# Patient Record
Sex: MALE | Race: WHITE
[De-identification: names, ages, dates, MRNs, and addresses within clinical notes are randomized per-mention and may not be internally consistent; named-entity substitution may affect disease eponyms.]

---

## 2018-11-01 ENCOUNTER — HOSPITAL ENCOUNTER (EMERGENCY)
Dept: HOSPITAL 75 - ER | Age: 58
Discharge: HOME | End: 2018-11-01
Payer: COMMERCIAL

## 2018-11-01 VITALS — BODY MASS INDEX: 35.31 KG/M2 | WEIGHT: 225 LBS | HEIGHT: 67 IN

## 2018-11-01 VITALS — DIASTOLIC BLOOD PRESSURE: 83 MMHG | SYSTOLIC BLOOD PRESSURE: 125 MMHG

## 2018-11-01 DIAGNOSIS — Z88.6: ICD-10-CM

## 2018-11-01 DIAGNOSIS — Z86.73: ICD-10-CM

## 2018-11-01 DIAGNOSIS — K21.9: ICD-10-CM

## 2018-11-01 DIAGNOSIS — J01.00: Primary | ICD-10-CM

## 2018-11-01 DIAGNOSIS — I10: ICD-10-CM

## 2018-11-01 LAB
ALBUMIN SERPL-MCNC: 4.2 GM/DL (ref 3.2–4.5)
ALP SERPL-CCNC: 46 U/L (ref 40–136)
ALT SERPL-CCNC: 19 U/L (ref 0–55)
AMORPH SED URNS QL MICRO: (no result) /LPF
APTT PPP: YELLOW S
BACTERIA #/AREA URNS HPF: (no result) /HPF
BASOPHILS # BLD AUTO: 0.1 10^3/UL (ref 0–0.1)
BASOPHILS NFR BLD AUTO: 1 % (ref 0–10)
BILIRUB SERPL-MCNC: 0.7 MG/DL (ref 0.1–1)
BILIRUB UR QL STRIP: NEGATIVE
BUN/CREAT SERPL: 11
CALCIUM SERPL-MCNC: 9.9 MG/DL (ref 8.5–10.1)
CHLORIDE SERPL-SCNC: 104 MMOL/L (ref 98–107)
CO2 SERPL-SCNC: 28 MMOL/L (ref 21–32)
CREAT SERPL-MCNC: 0.97 MG/DL (ref 0.6–1.3)
EOSINOPHIL # BLD AUTO: 0.7 10^3/UL (ref 0–0.3)
EOSINOPHIL NFR BLD AUTO: 7 % (ref 0–10)
ERYTHROCYTE [DISTWIDTH] IN BLOOD BY AUTOMATED COUNT: 13.7 % (ref 10–14.5)
FIBRINOGEN PPP-MCNC: CLEAR MG/DL
GFR SERPLBLD BASED ON 1.73 SQ M-ARVRAT: > 60 ML/MIN
GLUCOSE SERPL-MCNC: 131 MG/DL (ref 70–105)
GLUCOSE UR STRIP-MCNC: NEGATIVE MG/DL
HCT VFR BLD CALC: 43 % (ref 40–54)
HGB BLD-MCNC: 14.6 G/DL (ref 13.3–17.7)
KETONES UR QL STRIP: NEGATIVE
LEUKOCYTE ESTERASE UR QL STRIP: NEGATIVE
LYMPHOCYTES # BLD AUTO: 1.9 X 10^3 (ref 1–4)
LYMPHOCYTES NFR BLD AUTO: 18 % (ref 12–44)
MANUAL DIFFERENTIAL PERFORMED BLD QL: NO
MCH RBC QN AUTO: 29 PG (ref 25–34)
MCHC RBC AUTO-ENTMCNC: 34 G/DL (ref 32–36)
MCV RBC AUTO: 83 FL (ref 80–99)
MONOCYTES # BLD AUTO: 0.7 X 10^3 (ref 0–1)
MONOCYTES NFR BLD AUTO: 6 % (ref 0–12)
NEUTROPHILS # BLD AUTO: 6.8 X 10^3 (ref 1.8–7.8)
NEUTROPHILS NFR BLD AUTO: 68 % (ref 42–75)
NITRITE UR QL STRIP: NEGATIVE
PH UR STRIP: 7 [PH] (ref 5–9)
PLATELET # BLD: 281 10^3/UL (ref 130–400)
PMV BLD AUTO: 8.5 FL (ref 7.4–10.4)
POTASSIUM SERPL-SCNC: 3.7 MMOL/L (ref 3.6–5)
PROT SERPL-MCNC: 7.2 GM/DL (ref 6.4–8.2)
PROT UR QL STRIP: NEGATIVE
RBC # BLD AUTO: 5.13 10^6/UL (ref 4.35–5.85)
RBC #/AREA URNS HPF: (no result) /HPF
SODIUM SERPL-SCNC: 139 MMOL/L (ref 135–145)
SP GR UR STRIP: 1.01 (ref 1.02–1.02)
UROBILINOGEN UR-MCNC: NORMAL MG/DL
WBC # BLD AUTO: 10.1 10^3/UL (ref 4.3–11)
WBC #/AREA URNS HPF: (no result) /HPF

## 2018-11-01 PROCEDURE — 86141 C-REACTIVE PROTEIN HS: CPT

## 2018-11-01 PROCEDURE — 81000 URINALYSIS NONAUTO W/SCOPE: CPT

## 2018-11-01 PROCEDURE — 85379 FIBRIN DEGRADATION QUANT: CPT

## 2018-11-01 PROCEDURE — 84484 ASSAY OF TROPONIN QUANT: CPT

## 2018-11-01 PROCEDURE — 70450 CT HEAD/BRAIN W/O DYE: CPT

## 2018-11-01 PROCEDURE — 96361 HYDRATE IV INFUSION ADD-ON: CPT

## 2018-11-01 PROCEDURE — 96374 THER/PROPH/DIAG INJ IV PUSH: CPT

## 2018-11-01 PROCEDURE — 93005 ELECTROCARDIOGRAM TRACING: CPT

## 2018-11-01 PROCEDURE — 85025 COMPLETE CBC W/AUTO DIFF WBC: CPT

## 2018-11-01 PROCEDURE — 80053 COMPREHEN METABOLIC PANEL: CPT

## 2018-11-01 PROCEDURE — 36415 COLL VENOUS BLD VENIPUNCTURE: CPT

## 2018-11-01 PROCEDURE — 84443 ASSAY THYROID STIM HORMONE: CPT

## 2018-11-01 PROCEDURE — 93041 RHYTHM ECG TRACING: CPT

## 2018-11-01 PROCEDURE — 71045 X-RAY EXAM CHEST 1 VIEW: CPT

## 2018-11-01 RX ADMIN — ONDANSETRON ONE MG: 2 INJECTION, SOLUTION INTRAMUSCULAR; INTRAVENOUS at 17:40

## 2018-11-01 RX ADMIN — AMOXICILLIN AND CLAVULANATE POTASSIUM STA MG: 875; 125 TABLET, FILM COATED ORAL at 17:40

## 2018-11-01 RX ADMIN — SODIUM CHLORIDE ONE MLS/HR: 900 INJECTION, SOLUTION INTRAVENOUS at 16:30

## 2018-11-01 NOTE — DIAGNOSTIC IMAGING REPORT
INDICATION: Pre-syncope and weakness.



Frontal chest obtained at 03:51 p.m.



Heart is borderline in size. Mediastinal silhouette is

unremarkable. The lungs are clear. There is no pneumothorax or

pleural fluid.



IMPRESSION: No acute process in the chest.



Dictated by: 



  Dictated on workstation # [id number]

## 2018-11-01 NOTE — ED GENERAL
General


Chief Complaint:  Dizziness/Syncope


Stated Complaint:  DIZZINESS


Source of Information:  Patient


Exam Limitations:  No Limitations





History of Present Illness


Date Seen by Provider:  2018


Time Seen by Provider:  15:20


Initial Comments


Here by EMS with report of near syncope.  Apparently he was at a  when 

he started feeling weak and lightheaded.  He was able to make it outside and 

was feeling a little worse and was able to lay down on the ground.  This didn't 

help.  EMS was summoned and brought him here.  Vital signs appropriate and 

normal for EMS.  Does have history of previous stroke 2 years ago with no 

residual.  Has been suffering from upper respiratory symptoms over the last 

couple of weeks and finished a Z-Isidro about a week ago but had return of 

symptoms a few days ago and currently is doing antihistamine for sore throat 

and runny nose as well as some nasal congestion.  Denies nausea, vomiting, 

sweating or breathing problems.  Still has a little bit of dizziness which he 

is actually describing more as lightheadedness.  Currently states feels a 

little better.  He comes from [facility].  He is here with his brother at 

the .


Timing/Duration:  1 Hour, Changing Over Time, Intermittent (last 1-2 weeks)


Severity:  Mild, Moderate


Modifying Factors:  worse with Movement; improves with Rest


Associated Systoms:  No Chest Pain, No Diaphoresis, No Fever/Chills, No 

Headaches, No Shortness of Air; Syncope, Weakness





Allergies and Home Medications


Allergies


Coded Allergies:  


     ibuprofen (Verified  Allergy, Unknown, 18)





Home Medications


Amlodipine Besylate 5 Mg Tablet, 5 MG PO DAILY, (Reported)


Carvedilol 3.125 Mg Tablet, 3.125 MG PO BID, (Reported)


Oxycodone HCl/Acetaminophen 1 Each Tablet, 1 EACH PO Q4H PRN for PAIN-MODERATE, 

(Reported)





Patient Home Medication List


Home Medication List Reviewed:  Yes





Review of Systems


Review of Systems


Constitutional:  see HPI; No chills, No fever; weakness


EENTM:  see HPI; No ear pain, No mouth pain


Respiratory:  No cough, No short of breath


Cardiovascular:  see HPI; No palpitations


Gastrointestinal:  No abdominal pain, No nausea, No vomiting


Genitourinary:  no symptoms reported


Musculoskeletal:  no symptoms reported


Skin:  no symptoms reported


Psychiatric/Neurological:  See HPI


Hematologic/Lymphatic:  No Symptoms Reported





All Other Systems Reviewed


Negative Unless Noted:  Yes





Past Medical-Social-Family Hx


Past Med/Social Hx:  Reviewed Nursing Past Med/Soc Hx


Patient Social History


Alcohol Use:  Denies Use


Recreational Drug Use:  No


Smoking Status:  Never a Smoker


Physical Abuse:  No


Sexual Abuse:  No


Mistreated:  No


Fear:  No





Past Medical History


Surgeries:  Yes


Gallbladder, Orthopedic


Respiratory:  No


Cardiac:  Yes


Hypertension


Neurological:  Yes


Stroke


Genitourinary:  No


Gastrointestinal:  Yes


Gastroesophageal Reflux


Musculoskeletal:  No


Endocrine:  No


Cancer:  No





Family Medical History


Reviewed Nursing Family Hx





Physical Exam


Vital Signs





Vital Signs - First Documented








 18





 15:35


 


Temp 96.9


 


Pulse 84


 


Resp 20


 


B/P (MAP) 151/89 (109)


 


Pulse Ox 97





Capillary Refill :


Height, Weight, BMI


Height: '"


Weight: lbs. oz. kg;  BMI


Method:


General Appearance:  No Apparent Distress, WD/WN


HEENT:  PERRL/EOMI, Pharynx Normal


Neck:  Non Tender, Supple


Respiratory:  Lungs Clear, Normal Breath Sounds


Cardiovascular:  Regular Rate, Rhythm, No Murmur


Gastrointestinal:  Non Tender, Soft


Back:  Normal Inspection, No CVA Tenderness, No Vertebral Tenderness


Extremity:  Normal Range of Motion, Non Tender


Neurologic/Psychiatric:  Alert, Oriented x3, No Motor/Sensory Deficits, Normal 

Mood/Affect, CNs II-XII Norm as Tested


Skin:  Normal Color, Warm/Dry





Progress/Results/Core Measures


Suspected Sepsis


SIRS


Temperature: 


Pulse:  


Respiratory Rate: 


 


Laboratory Tests


18 15:36: White Blood Count 10.1


Blood Pressure  / 


Mean: 


 





Laboratory Tests


18 15:36: 


Creatinine 0.97, Platelet Count 281, Total Bilirubin 0.7








Results/Orders


Lab Results





Laboratory Tests








Test


 18


15:36 18


16:30 Range/Units


 


 


White Blood Count


 10.1 


 


 4.3-11.0


10^3/uL


 


Red Blood Count


 5.13 


 


 4.35-5.85


10^6/uL


 


Hemoglobin 14.6   13.3-17.7  G/DL


 


Hematocrit 43   40-54  %


 


Mean Corpuscular Volume 83   80-99  FL


 


Mean Corpuscular Hemoglobin 29   25-34  PG


 


Mean Corpuscular Hemoglobin


Concent 34 


 


 32-36  G/DL





 


Red Cell Distribution Width 13.7   10.0-14.5  %


 


Platelet Count


 281 


 


 130-400


10^3/uL


 


Mean Platelet Volume 8.5   7.4-10.4  FL


 


Neutrophils (%) (Auto) 68   42-75  %


 


Lymphocytes (%) (Auto) 18   12-44  %


 


Monocytes (%) (Auto) 6   0-12  %


 


Eosinophils (%) (Auto) 7   0-10  %


 


Basophils (%) (Auto) 1   0-10  %


 


Neutrophils # (Auto) 6.8   1.8-7.8  X 10^3


 


Lymphocytes # (Auto) 1.9   1.0-4.0  X 10^3


 


Monocytes # (Auto) 0.7   0.0-1.0  X 10^3


 


Eosinophils # (Auto)


 0.7 H


 


 0.0-0.3


10^3/uL


 


Basophils # (Auto)


 0.1 


 


 0.0-0.1


10^3/uL


 


D-Dimer


 0.33 


 


 0.00-0.49


UG/ML


 


Sodium Level 139   135-145  MMOL/L


 


Potassium Level 3.7   3.6-5.0  MMOL/L


 


Chloride Level 104     MMOL/L


 


Carbon Dioxide Level 28   21-32  MMOL/L


 


Anion Gap 7   5-14  MMOL/L


 


Blood Urea Nitrogen 11   7-18  MG/DL


 


Creatinine


 0.97 


 


 0.60-1.30


MG/DL


 


Estimat Glomerular Filtration


Rate > 60 


 


  





 


BUN/Creatinine Ratio 11    


 


Glucose Level 131 H    MG/DL


 


Calcium Level 9.9   8.5-10.1  MG/DL


 


Corrected Calcium 9.7   8.5-10.1  MG/DL


 


Total Bilirubin 0.7   0.1-1.0  MG/DL


 


Aspartate Amino Transf


(AST/SGOT) 22 


 


 5-34  U/L





 


Alanine Aminotransferase


(ALT/SGPT) 19 


 


 0-55  U/L





 


Alkaline Phosphatase 46     U/L


 


Troponin I < 0.30   <0.30  NG/ML


 


C-Reactive Protein High


Sensitivity 1.34 H


 


 0.00-0.50


MG/DL


 


Total Protein 7.2   6.4-8.2  GM/DL


 


Albumin 4.2   3.2-4.5  GM/DL


 


Thyroid Stimulating Hormone


(TSH) 1.65 


 


 0.35-4.94


UIU/ML


 


Urine Color  YELLOW   


 


Urine Clarity  CLEAR   


 


Urine pH  7  5-9  


 


Urine Specific Gravity  1.010 L 1.016-1.022  


 


Urine Protein  NEGATIVE  NEGATIVE  


 


Urine Glucose (UA)  NEGATIVE  NEGATIVE  


 


Urine Ketones  NEGATIVE  NEGATIVE  


 


Urine Nitrite  NEGATIVE  NEGATIVE  


 


Urine Bilirubin  NEGATIVE  NEGATIVE  


 


Urine Urobilinogen  NORMAL  NORMAL  MG/DL


 


Urine Leukocyte Esterase  NEGATIVE  NEGATIVE  


 


Urine RBC (Auto)  NEGATIVE  NEGATIVE  


 


Urine RBC  NONE   /HPF


 


Urine WBC  NONE   /HPF


 


Urine Crystals  PRESENT H  /LPF


 


Urine Amorphous Sediment


 


 FEW AUGUSTO


URATES H  /LPF





 


Urine Bacteria  NONE   /HPF


 


Urine Casts  NONE   /LPF


 


Urine Mucus  TRACE   /LPF


 


Urine Culture Indicated  NO   








My Orders





Orders - BALDO HOFFMAN MD


Cbc With Automated Diff (18 15:30)


Comprehensive Metabolic Panel (18 15:30)


Hs C Reactive Protein (18 15:30)


Fibrin Degradation Products (18 15:30)


Thyroid Stimulating Hormone (18 15:30)


Troponin I (18 15:30)


Ua Culture If Indicated (18 15:30)


Chest 1 View, Ap/Pa Only (18 15:30)


Ekg Tracing (18 15:30)


Monitor-Rhythm Ecg Trace Only (18 15:30)


Saline Lock/Iv-Start (18 15:30)


Ns Iv 1000 Ml (Sodium Chloride 0.9%) (18 15:30)


Ct Head Wo (18 15:30)





Medications Given in ED





Current Medications








 Medications  Dose


 Ordered  Sig/Ricky


 Route  Start Time


 Stop Time Status Last Admin


Dose Admin


 


 Sodium Chloride  1,000 ml @ 


 0 mls/hr  Q0M ONCE


 IV  18 15:30


 18 15:37 DC 18 16:30


0 MLS/HR








Vital Signs/I&O











 18





 15:35


 


Temp 96.9


 


Pulse 84


 


Resp 20


 


B/P (MAP) 151/89 (109)


 


Pulse Ox 97





Capillary Refill :


Progress Note :  


Progress Note


Seen and evaluated.  IV, labs, EKG and chest x-ray ordered.  Normal saline 1 L 

bolus.  We will check CT of the head.  UA ordered as well.  1700: CT results 

noted.  Feeling a little better.  Decadron 10 mg IV and Augmentin 875 one tab 

by mouth given.  Discharged home with return precautions.  Patient verbalize 

understanding instructions and agreement with plan.





ECG


Initial ECG Impression Date:  2018


Initial ECG Impression Time:  16:21


Initial ECG Rate:  67


Initial ECG Rhythm:  Normal Sinus


Comment


Sinus rhythm with normal axis.  No evidence of ST elevation MI.  Artifact noted 

on EKG but does have underlying P waves with AK interval less than 20.  

Interpreted by me.





Diagnostic Imaging





   Diagonstic Imaging:  CT


   Plain Films/CT/US/NM/MRI:  head


Comments


 [facility] [employer]


 [place], [place]





NAME:   GERRY LARSEN


MED REC#:   [id number]


ACCOUNT#:   [id number]


PT STATUS:   REG ER


:   1960


PHYSICIAN:   BALDO HOFFMAN MD


ADMIT DATE:   18/ER


 ***Draft***


Date of Exam:18





CT HEAD WO








INDICATION: Pre-syncope and weakness.





Noncontrast brain CT is performed.





There is no previous study for comparison.





There are no extra-axial fluid collections. No intracranial


hemorrhage. No intracranial mass or mass effect. No midline


shift. The ventricles are normal in size and position. There is


an old lacunar infarct in the right lenticular nucleus. There is


an old lacunar infarct in the right caudate nucleus as well.


There is no definite acute-appearing abnormality. Calvarial


windows are unremarkable. There is some fluid and mucosal


thickening in the ethmoid air cells and visualized portions of


the maxillary sinuses.





IMPRESSION: No acute hemorrhage or mass effect. Old lacunar


infarcts in the right caudate body and lenticular nucleus are


noted. Underlying sinus disease in the visualized portions of


mastoid sinuses and ethmoid air cells.





  Dictated on workstation # [id number]








Dict:   18 1605


Trans:   18 1610


 [id number]





Interpreted by:     JUAN MANUEL MCNEIL MD


Electronically signed by:








   Diagonstic Imaging:  Xray


   Plain Films/CT/US/NM/MRI:  chest


Comments


 [facility] [facility].


 [place], [place]





NAME:   GERRY LARSEN


MED REC#:   [id number]


ACCOUNT#:   [id number]


PT STATUS:   REG ER


:   1960


PHYSICIAN:   BALDO HOFFMAN MD


ADMIT DATE:   18/ER


 ***Draft***


Date of Exam:18





CHEST 1 VIEW, AP/PA ONLY








INDICATION: Pre-syncope and weakness.





Frontal chest obtained at 03:51 p.m.





Heart is borderline in size. Mediastinal silhouette is


unremarkable. The lungs are clear. There is no pneumothorax or


pleural fluid.





IMPRESSION: No acute process in the chest.





  Dictated on workstation # [id number]








Dict:   18 1602


Trans:   18 1604


 [id number]





Interpreted by:     JUAN MANUEL MCNEIL MD


Electronically signed by:





Departure


Impression





 Primary Impression:  


 Acute sinusitis


 Qualified Codes:  J01.00 - Acute maxillary sinusitis, unspecified


Disposition:   HOME, SELF-CARE


Condition:  Improved





Departure-Patient Inst.


Decision time for Depature:  17:05


Referrals:  


NO,LOCAL PHYSICIAN (PCP)


Primary Care Physician


Patient Instructions:  Sinusitis, Adult (DC)





Add. Discharge Instructions:  


All discharge instructions reviewed with patient and/or family. Voiced 

understanding.





Take medications as directed.  Follow-up with your Dr. in a few days for 

recheck.  Drink plenty of fluids.  Return for worse pain, fever, vomiting, 

weakness, rhythm problems or other concerns as needed.


Scripts


Amoxicillin/Potassium Clav (Amox Tr-K Clv 875-125 mg Tab) 1 Each Tablet


1 EACH PO BID, #19 TAB 0 Refills


   Prov: BALDO HOFFMAN MD         18











BALDO HOFFMAN MD 2018 15:45

## 2018-11-01 NOTE — DIAGNOSTIC IMAGING REPORT
INDICATION: Pre-syncope and weakness.



Noncontrast brain CT is performed.



There is no previous study for comparison.



There are no extra-axial fluid collections. No intracranial

hemorrhage. No intracranial mass or mass effect. No midline

shift. The ventricles are normal in size and position. There is

an old lacunar infarct in the right lenticular nucleus. There is

an old lacunar infarct in the right caudate nucleus as well.

There is no definite acute-appearing abnormality. Calvarial

windows are unremarkable. There is some fluid and mucosal

thickening in the ethmoid air cells and visualized portions of

the maxillary sinuses.



IMPRESSION: No acute hemorrhage or mass effect. Old lacunar

infarcts in the right caudate body and lenticular nucleus are

noted. Underlying sinus disease in the visualized portions of

mastoid sinuses and ethmoid air cells.



Dictated by: 



  Dictated on workstation # [id number]